# Patient Record
Sex: MALE | Race: WHITE
[De-identification: names, ages, dates, MRNs, and addresses within clinical notes are randomized per-mention and may not be internally consistent; named-entity substitution may affect disease eponyms.]

---

## 2017-06-18 ENCOUNTER — HOSPITAL ENCOUNTER (EMERGENCY)
Dept: HOSPITAL 25 - ED | Age: 47
LOS: 1 days | Discharge: HOME | End: 2017-06-19
Payer: COMMERCIAL

## 2017-06-18 DIAGNOSIS — M54.9: ICD-10-CM

## 2017-06-18 DIAGNOSIS — K21.9: ICD-10-CM

## 2017-06-18 DIAGNOSIS — K82.9: Primary | ICD-10-CM

## 2017-06-18 DIAGNOSIS — K80.80: ICD-10-CM

## 2017-06-18 DIAGNOSIS — R10.11: ICD-10-CM

## 2017-06-18 LAB
ALBUMIN SERPL BCG-MCNC: 4.7 G/DL (ref 3.2–5.2)
ALP SERPL-CCNC: 58 U/L (ref 34–104)
ALT SERPL W P-5'-P-CCNC: 77 U/L (ref 7–52)
AMYLASE SERPL-CCNC: 29 U/L (ref 29–103)
ANION GAP SERPL CALC-SCNC: 8 MMOL/L (ref 2–11)
AST SERPL-CCNC: 114 U/L (ref 13–39)
BUN SERPL-MCNC: 16 MG/DL (ref 6–24)
BUN/CREAT SERPL: 13.6 (ref 8–20)
CALCIUM SERPL-MCNC: 9.8 MG/DL (ref 8.6–10.3)
CHLORIDE SERPL-SCNC: 104 MMOL/L (ref 101–111)
GLOBULIN SER CALC-MCNC: 2.4 G/DL (ref 2–4)
GLUCOSE SERPL-MCNC: 112 MG/DL (ref 70–100)
HCO3 SERPL-SCNC: 25 MMOL/L (ref 22–32)
HCT VFR BLD AUTO: 45 % (ref 42–52)
HGB BLD-MCNC: 15.3 G/DL (ref 14–18)
LIPASE SERPL-CCNC: 42 U/L (ref 11–82)
MCH RBC QN AUTO: 28 PG (ref 27–31)
MCHC RBC AUTO-ENTMCNC: 35 G/DL (ref 31–36)
MCV RBC AUTO: 82 FL (ref 80–94)
POTASSIUM SERPL-SCNC: 3.8 MMOL/L (ref 3.5–5)
PROT SERPL-MCNC: 7.1 G/DL (ref 6.4–8.9)
RBC # BLD AUTO: 5.41 10^6/UL (ref 4–5.4)
SODIUM SERPL-SCNC: 137 MMOL/L (ref 133–145)
WBC # BLD AUTO: 10.4 10^3/UL (ref 3.5–10.8)

## 2017-06-18 PROCEDURE — 83690 ASSAY OF LIPASE: CPT

## 2017-06-18 PROCEDURE — 96374 THER/PROPH/DIAG INJ IV PUSH: CPT

## 2017-06-18 PROCEDURE — 76705 ECHO EXAM OF ABDOMEN: CPT

## 2017-06-18 PROCEDURE — 96375 TX/PRO/DX INJ NEW DRUG ADDON: CPT

## 2017-06-18 PROCEDURE — 86140 C-REACTIVE PROTEIN: CPT

## 2017-06-18 PROCEDURE — 36415 COLL VENOUS BLD VENIPUNCTURE: CPT

## 2017-06-18 PROCEDURE — 99284 EMERGENCY DEPT VISIT MOD MDM: CPT

## 2017-06-18 PROCEDURE — 83605 ASSAY OF LACTIC ACID: CPT

## 2017-06-18 PROCEDURE — 80053 COMPREHEN METABOLIC PANEL: CPT

## 2017-06-18 PROCEDURE — 85025 COMPLETE CBC W/AUTO DIFF WBC: CPT

## 2017-06-18 PROCEDURE — 82150 ASSAY OF AMYLASE: CPT

## 2017-06-19 VITALS — DIASTOLIC BLOOD PRESSURE: 88 MMHG | SYSTOLIC BLOOD PRESSURE: 142 MMHG

## 2017-06-19 NOTE — ED
Kang RICH Alok, scribed for Kellie Santizo MD on 06/18/17 at 2249 .





Abdominal Pain/Male





- HPI Summary


HPI Summary: 


46M presents to the ED with RUQ abd pain radiating to the back beginning at 

1930 this evening. Pt states he consumed a fatty meal one hour before abd pain 

began and describes the pain as a burning. Pt states he has had a similar 

episode of abd pain in the recent past more localized in the back. Pt states 

his abd pain has improved somewhat since arriving to the ED but is still 

present. Pt denies dysuria. Pt drinks ETOH occasionally.








- History of Current Complaint


Chief Complaint: EDAbdPain


Stated Complaint: ABD PAIN


Time Seen by Provider: 06/18/17 22:04


Hx Obtained From: Patient


Onset/Duration: Lasting Hours, Still Present


Timing: Constant


Severity Initially: Moderate


Severity Currently: Moderate


Pain Intensity: 6


Pain Scale Used: 0-10 Numeric


Location: Discrete At: RUQ


Radiates: Yes


Radiates to: Back


Character: Burning


Aggravating Factor(s): Food


Alleviating Factor(s): Nothing


Associated Signs And Symptoms: Positive: Back Pain.  Negative: Fever, Urinary 

Symptoms





- Allergies/Home Medications


Allergies/Adverse Reactions: 


 Allergies











Allergy/AdvReac Type Severity Reaction Status Date / Time


 


No Known Allergies Allergy   Verified 01/20/15 09:31














PMH/Surg Hx/FS Hx/Imm Hx


Endocrine/Hematology History: 


   Denies: Hx Diabetes


Cardiovascular History: 


   Denies: Hx Hypertension, Hx Pacemaker/ICD


 History: 


   Denies: Hx Renal Disease


Sensory History: 


   Denies: Hx Hearing Aid


Psychiatric History: 


   Denies: Hx Panic Disorder





- Surgical History


Surgery Procedure, Year, and Place: VASECTOMY.  LASIX EYE SURG


Infectious Disease History: No


Infectious Disease History: 


   Denies: Traveled Outside the [place] in Last 30 Days





- Family History


Known Family History: 


   Negative: Diabetes





- Social History


Occupation: Employed Full-time


Alcohol Use: None


Substance Use Type: Reports: None


Smoking Status (MU): Never Smoked Tobacco





Review of Systems


Negative: Fever


Positive: Abdominal Pain


Negative: dysuria


Positive: Other - back pain


All Other Systems Reviewed And Are Negative: Yes





Physical Exam


Triage Information Reviewed: Yes


Vital Signs On Initial Exam: 


 Initial Vitals











Temp Pulse Resp BP Pulse Ox


 


 97.6 F   84   18   203/102   98 


 


 06/18/17 20:50  06/18/17 20:50  06/18/17 20:50  06/18/17 20:50  06/18/17 20:50











Vital Signs Reviewed: Yes


Appearance: Positive: Well-Appearing, No Pain Distress


Skin: Positive: Warm, Skin Color Reflects Adequate Perfusion, Dry


Eyes: Positive: EOMI, LESIA


ENT: Positive: Pharynx normal, TMs normal


Neck: Positive: Supple, Nontender


Respiratory/Lung Sounds: Positive: Clear to Auscultation, Breath Sounds 

Present.  Negative: Rales, Rhonchi, Wheezes


Cardiovascular: Positive: RRR, Other - no gallop.  Negative: Murmur, Rub


Abdomen Description: Positive: Soft, Other: - no rebound. RUQ tenderness..  

Negative: Distended, Guarding


Bowel Sounds: Positive: Present


Musculoskeletal: Positive: Strength/ROM Intact.  Negative: Edema Left, Edema 

Right


Neurological: Positive: Sensory/Motor Intact, Alert, Oriented to Person Place, 

Time, CN Intact II-III


Psychiatric: Positive: Affect/Mood Appropriate





- Jefferson Coma Scale


Coma Scale Total: 15





Diagnostics





- Vital Signs


 Vital Signs











  Temp Pulse Resp BP Pulse Ox


 


 06/18/17 22:30   73  11  143/85  95


 


 06/18/17 22:00   73  17  135/94  94


 


 06/18/17 21:34    11  


 


 06/18/17 21:32     153/93 


 


 06/18/17 20:50  97.6 F  84  18  203/102  98














- Laboratory


Lab Results: 


 Lab Results











  06/18/17 06/18/17 06/18/17 Range/Units





  21:37 21:37 21:37 


 


WBC  10.4    (3.5-10.8)  10^3/ul


 


RBC  5.41 H    (4.0-5.4)  10^6/ul


 


Hgb  15.3    (14.0-18.0)  g/dl


 


Hct  45    (42-52)  %


 


MCV  82    (80-94)  fL


 


MCH  28    (27-31)  pg


 


MCHC  35    (31-36)  g/dl


 


RDW  14    (10.5-15)  %


 


Plt Count  200    (150-450)  10^3/ul


 


MPV  9    (7.4-10.4)  um3


 


Neut % (Auto)  75.5    (38-83)  %


 


Lymph % (Auto)  17.9 L    (25-47)  %


 


Mono % (Auto)  5.2    (1-9)  %


 


Eos % (Auto)  0.8    (0-6)  %


 


Baso % (Auto)  0.6    (0-2)  %


 


Absolute Neuts (auto)  7.9 H    (1.5-7.7)  10^3/ul


 


Absolute Lymphs (auto)  1.9    (1.0-4.8)  10^3/ul


 


Absolute Monos (auto)  0.5    (0-0.8)  10^3/ul


 


Absolute Eos (auto)  0.1    (0-0.6)  10^3/ul


 


Absolute Basos (auto)  0.1    (0-0.2)  10^3/ul


 


Absolute Nucleated RBC  0.01    10^3/ul


 


Nucleated RBC %  0.1    


 


Sodium   137   (133-145)  mmol/L


 


Potassium   3.8   (3.5-5.0)  mmol/L


 


Chloride   104   (101-111)  mmol/L


 


Carbon Dioxide   25   (22-32)  mmol/L


 


Anion Gap   8   (2-11)  mmol/L


 


BUN   16   (6-24)  mg/dL


 


Creatinine   1.18 H   (0.67-1.17)  mg/dL


 


Est GFR ( Amer)   86.2   (>60)  


 


Est GFR (Non-Af Amer)   67.1   (>60)  


 


BUN/Creatinine Ratio   13.6   (8-20)  


 


Glucose   112 H   ()  mg/dL


 


Lactic Acid    1.1  (0.5-2.0)  mmol/L


 


Calcium   9.8   (8.6-10.3)  mg/dL


 


Total Bilirubin   0.70   (0.2-1.0)  mg/dL


 


AST   114 H   (13-39)  U/L


 


ALT   77 H   (7-52)  U/L


 


Alkaline Phosphatase   58   ()  U/L


 


C-Reactive Protein   1.27   (< 5.00)  mg/L


 


Total Protein   7.1   (6.4-8.9)  g/dL


 


Albumin   4.7   (3.2-5.2)  g/dL


 


Globulin   2.4   (2-4)  g/dL


 


Albumin/Globulin Ratio   2.0   (1-3)  


 


Amylase   29   ()  U/L


 


Lipase   42   (11.0-82.0)  U/L











Result Diagrams: 


 06/18/17 21:37





 06/18/17 21:37


Lab Statement: Any lab studies that have been ordered have been reviewed, and 

results considered in the medical decision making process.





- Additional Comments


Diagnostic Additional Comments: 


gallbladder US - Findings: Positive for cholelithiasis. However no gallbladder 

wall thickening or pericholecystic fluid. Ultrasound Powell's sign is negative 

per ultrasonographer. The common bile duct could not be visualized.  Liver is 

borderline enlarged at 18 cm and is fatty infiltrated. No right hydronephrosis. 

No right upper quadrant free fluid.








Abdominal Pain Fem Course/Dx





- Course


Course Of Treatment: 45 yo male with onset of epigastric pain radiating to back 

30 mins after eating meat tacos with sour cream.  He is tender to palpation 

epigastric and right upper quadrant, u/s shows stones and liver enzymes are sl 

elevated. Pancreatic enzymes are neg and he is not a drinker.  He has no risks 

for ischemic colitis but his pressure was elevated when he arrived and was in 

pain that pain rapidly resolved on its own. He does still have pain at a 2 but 

does not want meds. He will followup with surgery, he does also identify some 

reflux and so he will be started on protonix for 14 days





- Diagnoses


Provider Diagnoses: 


 Gall bladder disease, Gallstones, GERD (gastroesophageal reflux disease)








Discharge





- Discharge Plan


Condition: Stable


Disposition: HOME


Prescriptions: 


Pantoprazole Sodium [Protonix] 20 mg PO DAILY #14 tab


Patient Education Materials:  Gallstones (ED), Gastroesophageal Reflux Disease (

ED)


Referrals: 


Danielle Bragg NP [Primary Care Provider] - 


Krishan Russell MD [Medical Doctor] - 





The documentation as recorded by the Kang inman Alok accurately reflects 

the service I personally performed and the decisions made by me, Kellie Santizo MD.

## 2017-06-19 NOTE — RAD
INDICATION:  Right upper quadrant pain.



COMPARISON:  There are no prior studies available for comparison.



TECHNIQUE: Multiple real-time images of the right upper quadrant were obtained.



FINDINGS: There are gallstones present. No pericholecystic fluid or gallbladder wall

thickening is present. No positive sonographic Powell sign is seen.  No intra or

extrahepatic ductal distention is present. The common bile duct was not well seen.



The liver is normal in size and increased in echogenicity suggestive of fatty

infiltration. No significant focal abnormality is seen. The pancreas was appear borderline

bowel gas.



The right kidney is normal in size without evidence for hydronephrosis.



IMPRESSION: 

1. CHOLELITHIASIS WITHOUT EVIDENCE FOR ACUTE CHOLECYSTITIS.

2. FINDINGS SUGGESTIVE OF FATTY INFILTRATION OF THE LIVER.

## 2019-02-24 ENCOUNTER — HOSPITAL ENCOUNTER (EMERGENCY)
Dept: HOSPITAL 25 - ED | Age: 49
Discharge: HOME | End: 2019-02-24
Payer: COMMERCIAL

## 2019-02-24 VITALS — SYSTOLIC BLOOD PRESSURE: 148 MMHG | DIASTOLIC BLOOD PRESSURE: 89 MMHG

## 2019-02-24 DIAGNOSIS — M54.9: ICD-10-CM

## 2019-02-24 DIAGNOSIS — R10.13: ICD-10-CM

## 2019-02-24 DIAGNOSIS — R10.9: ICD-10-CM

## 2019-02-24 DIAGNOSIS — R07.9: ICD-10-CM

## 2019-02-24 DIAGNOSIS — K80.50: Primary | ICD-10-CM

## 2019-02-24 LAB
ALBUMIN SERPL BCG-MCNC: 5.1 G/DL (ref 3.2–5.2)
ALBUMIN/GLOB SERPL: 2.1 {RATIO} (ref 1–3)
ALP SERPL-CCNC: 57 U/L (ref 34–104)
ALT SERPL W P-5'-P-CCNC: 16 U/L (ref 7–52)
ANION GAP SERPL CALC-SCNC: 5 MMOL/L (ref 2–11)
AST SERPL-CCNC: 17 U/L (ref 13–39)
BASOPHILS # BLD AUTO: 0.1 10^3/UL (ref 0–0.2)
BUN SERPL-MCNC: 17 MG/DL (ref 6–24)
BUN/CREAT SERPL: 14 (ref 8–20)
CALCIUM SERPL-MCNC: 9.8 MG/DL (ref 8.6–10.3)
CHLORIDE SERPL-SCNC: 106 MMOL/L (ref 101–111)
EOSINOPHIL # BLD AUTO: 0.1 10^3/UL (ref 0–0.6)
GLOBULIN SER CALC-MCNC: 2.4 G/DL (ref 2–4)
GLUCOSE SERPL-MCNC: 113 MG/DL (ref 70–100)
HCO3 SERPL-SCNC: 25 MMOL/L (ref 22–32)
HCT VFR BLD AUTO: 45 % (ref 42–52)
HGB BLD-MCNC: 15.5 G/DL (ref 14–18)
INR PPP/BLD: 0.93 (ref 0.77–1.02)
LYMPHOCYTES # BLD AUTO: 2 10^3/UL (ref 1–4.8)
MCH RBC QN AUTO: 30 PG (ref 27–31)
MCHC RBC AUTO-ENTMCNC: 34 G/DL (ref 31–36)
MCV RBC AUTO: 87 FL (ref 80–94)
MONOCYTES # BLD AUTO: 0.6 10^3/UL (ref 0–0.8)
NEUTROPHILS # BLD AUTO: 6.3 10^3/UL (ref 1.5–7.7)
NRBC # BLD AUTO: 0 10^3/UL
NRBC BLD QL AUTO: 0
PLATELET # BLD AUTO: 266 10^3/UL (ref 150–450)
POTASSIUM SERPL-SCNC: 4.1 MMOL/L (ref 3.5–5)
PROT SERPL-MCNC: 7.5 G/DL (ref 6.4–8.9)
RBC # BLD AUTO: 5.19 10^6/UL (ref 4–5.4)
SODIUM SERPL-SCNC: 136 MMOL/L (ref 135–145)
TROPONIN I SERPL-MCNC: 0 NG/ML (ref ?–0.04)
WBC # BLD AUTO: 9 10^3/UL (ref 3.5–10.8)

## 2019-02-24 PROCEDURE — 83690 ASSAY OF LIPASE: CPT

## 2019-02-24 PROCEDURE — 93005 ELECTROCARDIOGRAM TRACING: CPT

## 2019-02-24 PROCEDURE — 36415 COLL VENOUS BLD VENIPUNCTURE: CPT

## 2019-02-24 PROCEDURE — 80053 COMPREHEN METABOLIC PANEL: CPT

## 2019-02-24 PROCEDURE — 85025 COMPLETE CBC W/AUTO DIFF WBC: CPT

## 2019-02-24 PROCEDURE — 85610 PROTHROMBIN TIME: CPT

## 2019-02-24 PROCEDURE — 86140 C-REACTIVE PROTEIN: CPT

## 2019-02-24 PROCEDURE — 99283 EMERGENCY DEPT VISIT LOW MDM: CPT

## 2019-02-24 PROCEDURE — 84484 ASSAY OF TROPONIN QUANT: CPT

## 2019-02-24 PROCEDURE — 83605 ASSAY OF LACTIC ACID: CPT

## 2019-02-24 PROCEDURE — 96372 THER/PROPH/DIAG INJ SC/IM: CPT

## 2019-02-24 NOTE — ED
Complex/Multi-Sys Presentation





- HPI Summary


HPI Summary: 


 Patient is a 49 y/o M presenting to ED with complaints of back pain, midsternal

/epigastric pain onsetting last night. In the room, he states that he thinks he 

is having another "gallbladder attack". PMHx of gallstones, patient is followed 

by Dr. Jennings. He states that there was a desire to remove his gallbladder but 

patient decided against this. Patient notes that he is to see Dr. Jennings 

tomorrow on 2/25. At present, pain is mostly in upper back but it has been 

"moving" to midsternal chest/epigastric area. Onset of pain at 2100/2130 last 

night, he denies pain in shoulder, states that pain onset a few hours after he 

ate pasta and chicken dinner. Patient reports similar episodes of pain related 

to his gallbladder issues, but states that he was concerned that the pain was 

migrating to his chest/epigastric area. Patient does not smoke cigarettes. No N/

V but states he was experiencing reflux. Patient took 3 ibuprofen 3 hours ago 

after onset of pain with no relief or worsening of Sx. In room, he states pain 

is improving. FMHx of kidney stones. No Hx of pancreatic issues, states he is 

an occasional drinker of alcohol. On triage, pain is rated 7/10, nothing is 

noted to aggravate/alleviate Sx. Home medications and allergies are reviewed. 








- History Of Current Complaint


Chief Complaint: EDAbdPain


Time Seen by Provider: 02/24/19 02:59


Hx Obtained From: Patient


Onset/Duration: Lasting Hours - onset 2100/2130 last night, Still Present


Timing: Constant, Hours - onset 2100/2130 last night


Severity Currently: Severe


Severity Initially: Moderate


Location: Pain At: - upper back, chest/epigastric area


Aggravating Factor(s): nothing


Alleviating Factor(s): nothing


Associated Signs And Symptoms: Positive: Chest Pain, Abdominal Pain - epigastric

, Back Pain.  Negative: Nausea, Vomiting





- Allergies/Home Medications


Allergies/Adverse Reactions: 


 Allergies











Allergy/AdvReac Type Severity Reaction Status Date / Time


 


No Known Allergies Allergy   Verified 02/24/19 02:47














PMH/Surg Hx/FS Hx/Imm Hx


Endocrine/Hematology History: 


   Denies: Hx Diabetes


Cardiovascular History: 


   Denies: Hx Hypertension, Hx Pacemaker/ICD


 History: 


   Denies: Hx Renal Disease


Sensory History: 


   Denies: Hx Hearing Aid


Psychiatric History: 


   Denies: Hx Panic Disorder





- Surgical History


Surgery Procedure, Year, and Place: VASECTOMY.  LASIX EYE SURG


Infectious Disease History: No


Infectious Disease History: 


   Denies: Traveled Outside the [place] in Last 30 Days





- Family History


Known Family History: Positive: Renal Disease - kidney stones 


   Negative: Diabetes





- Social History


Alcohol Use: None


Substance Use Type: Reports: None


Smoking Status (MU): Never Smoked Tobacco





Review of Systems


Positive: Chest Pain


Positive: Abdominal Pain - epigastric .  Negative: Vomiting, Nausea


Musculoskeletal: Other - POSITIVE - BACK PAIN; NEGATIVE - SHOULDER PAIN 


All Other Systems Reviewed And Are Negative: Yes





Physical Exam





- Summary


Physical Exam Summary: 


Appearance: Well appearing, no pain distress


Skin: warm, dry, reflects adequate perfusion


Head/face: normal


Eyes: EOMI, LESIA


ENT: mucous membranes moist


Neck: supple, non-tender


Respiratory: CTA, breath sounds present


Cardiovascular: RRR, pulses symmetrical 


Abdomen: non-tender, soft, no palpable masses


Bowel Sounds: present


Musculoskeletal: normal, strength/ROM intact


Neuro: normal, sensory motor intact, A&Ox3








Triage Information Reviewed: Yes


Vital Signs On Initial Exam: 


 Initial Vitals











Temp Pulse Resp BP Pulse Ox


 


 97.9 F   78   18   172/112   96 


 


 02/24/19 02:44  02/24/19 02:44  02/24/19 02:44  02/24/19 02:44  02/24/19 02:44











Vital Signs Reviewed: Yes





Diagnostics





- Vital Signs


 Vital Signs











  Temp Pulse Resp BP Pulse Ox


 


 02/24/19 02:44  97.9 F  78  18  172/112  96














- Laboratory


Result Diagrams: 


 02/24/19 03:05





 02/24/19 03:05


Lab Statement: Any lab studies that have been ordered have been reviewed, and 

results considered in the medical decision making process.





- Radiology


  ** bedside ultrasound


Radiology Interpretation Completed By: ED Physician - Performed by me.  

Gallbladder is nondistended with visible stones.  The wall is normal and there 

is no pericholecystic fluid negative for sonographic Powell.  The abdominal 

aorta measures 2.55 cm





- EKG


  ** 0330


Cardiac Rate: NL - rate of 69 BPM


EKG Rhythm: Sinus Rhythm


ST Segment: Normal


Summary of EKG Findings: EKG showed NSR with rate of 69 BPM, normal axis, 

normal interval, normal ST.





Re-Evaluation





- Re-Evaluation


  ** First Eval


Re-Evaluation Time: 03:15


Comment: Bedside US performed by Dr. Medina. Gallstones noted, no sonogprahic 

powell's, could not visualize common bile ducts, no thickening of the wall.





  ** Second Eval


Re-Evaluation Time: 04:58


Change: Improved


Comment: Patient reports that he is feeling better after medications. Patient 

will be discharged to home and follow up with Dr. Jennings as planned tomorrow.





Complex Multi-Symp Course/Dx


Course Of Treatment: Nurse's notes reviewed.  Patient with a history of biliary 

colic and known gallstones presents with mid back pain that is nonradiating and 

a lack of urinary symptoms.  He has normal lipase and LFTs.  Bedside ultrasound 

was performed given that there is no available formal ultrasound at this hour.  

There is no evidence for acute cholecystitis.  The common bile duct was not 

visualized.  He was treated here for discomfort with significant relief.  He is 

able to be discharged home and has follow-up with his surgeon on Monday morning.





- Diagnoses


Differential Diagnoses/HQI/PQRI: Other - Pancreatitis, cholecystitis, acute 

gastritis, abdominal aortic aneurysm


Provider Diagnoses: 


 Biliary colic, Abdominal pain








Discharge





- Sign-Out/Discharge


Documenting (check all that apply): Patient Departure - discharge


Patient Received Moderate/Deep Sedation with Procedure: No - NO PROCEDURES DONE





- Discharge Plan


Condition: Improved


Disposition: HOME


Prescriptions: 


Famotidine TAB* [Pepcid 20 MG TAB*] 20 mg PO BID #20 tab


Naproxen [Naproxen 500 mg tab] 500 mg PO BID PRN #10 tablet


 PRN Reason: Pain


traMADol TAB* [Ultram*] 50 mg PO Q8H PRN #10 tab MDD 3


 PRN Reason: more severe pain


Patient Education Materials:  Biliary Colic (ED)


Referrals: 


Rabia Suazo MD [Primary Care Provider] - 


Kaden Jennings MD [Medical Doctor] - 


Additional Instructions: 


Follow-up with the surgeon on Monday as scheduled.  You may need to have 

further testing done.  Strictly avoid fat in your diet.  Return with fever, 

vomiting, uncontrolled pain, worse or other concerns.





- Billing Disposition and Condition


Condition: IMPROVED


Disposition: Home





- Attestation Statements


Document Initiated by Scribe: Yes


Documenting Scribe: MARCELA STALEY 


Provider For Whom Scribe is Documenting (Include Credential): RITA MEDINA MD


Scribe Attestation: 


I, MARCELA STALEY , scribed for RITA MEDINA MD on 02/24/19 at 0710. 


Scribe Documentation Reviewed: Yes


Provider Attestation: 


The documentation as recorded by the scribe, MARCELA STALEY  accurately reflects 

the service I personally performed and the decisions made by me, RITA MEDINA MD


Status of Scribe Document: Viewed

## 2019-03-12 ENCOUNTER — HOSPITAL ENCOUNTER (OUTPATIENT)
Dept: HOSPITAL 25 - OR | Age: 49
Discharge: HOME | End: 2019-03-12
Attending: SURGERY
Payer: COMMERCIAL

## 2019-03-12 VITALS — SYSTOLIC BLOOD PRESSURE: 160 MMHG | DIASTOLIC BLOOD PRESSURE: 98 MMHG

## 2019-03-12 DIAGNOSIS — K80.10: Primary | ICD-10-CM

## 2019-03-12 PROCEDURE — 88304 TISSUE EXAM BY PATHOLOGIST: CPT

## 2019-03-12 RX ADMIN — HYDROMORPHONE HYDROCHLORIDE PRN MG: 1 INJECTION, SOLUTION INTRAMUSCULAR; INTRAVENOUS; SUBCUTANEOUS at 09:57

## 2019-03-12 RX ADMIN — HYDROMORPHONE HYDROCHLORIDE PRN MG: 1 INJECTION, SOLUTION INTRAMUSCULAR; INTRAVENOUS; SUBCUTANEOUS at 10:11

## 2019-03-12 NOTE — OP
CC:  Rabia Suazo MD *

 

DATE OF OPERATION:  03/12/19 - [facility]

 

DATE OF BIRTH:  [birth year]

 

SURGEON:  Kaden Jennings MD.

 

ASSISTANT:  MAREK Desir student.

 

ANESTHESIOLOGIST:  Cait Purdy MD

 

ANESTHESIA:  General endotracheal.

 

PRE-OP DIAGNOSIS:  Symptomatic cholelithiasis.

 

POST-OP DIAGNOSES:  Symptomatic cholelithiasis and chronic cholecystitis.

 

OPERATIVE PROCEDURE:  Laparoscopic cholecystectomy.

 

ESTIMATED BLOOD LOSS:  Less than 50 mL.

 

IV FLUIDS:  Crystalloid.

 

SPECIMENS:  Gallbladder.

 

DRAINS:  None.

 

COMPLICATIONS:  None.

 

COUNTS:  The instruments, needle and sponge counts were correct.

 

DESCRIPTION OF PROCEDURE:  The patient was brought to the operating room and 
placed on the table supine.  Sequential compression devices were placed on both 
lower extremities.  General anesthesia was administered.  The abdomen was 
prepped and draped in the usual sterile fashion.  A time-out was performed.

 

Local anesthetic was infiltrated into the skin and soft tissue prior to making 
each incision.  Entry to the abdomen was through a transumbilical vertical 
incision using an open technique.  After accessing the peritoneal cavity, a 5 
mm trocar was placed, carbon dioxide was insufflated to a pressure of 15 mmHg.  
Under direct visualization, 5 mm trocars were placed in the subxiphoid position 
and 2 in the right upper quadrant.  The initial umbilical trocars were 
exchanged for a 

12 mm trocar.

 

Inspection in the right upper quadrant revealed omentum adherent to the fundus 
of the gallbladder and this was taken down using a combination of sharp and 
blunt dissection.  The gallbladder fundus was grasped and retracted superiorly.
  There appeared to be chronic inflammatory changes.  There were adhesions to 
the duodenum that were taken down sharply.  The peritoneum investing the 
gallbladder was incised on the medial and lateral aspects in order to dissect 
down to the cystic duct and cystic artery and obtain a critical view.  Both 
structures were doubly clipped and divided.  The gallbladder was grasped at the 
cystic duct stump and retracted cephalad as the gallbladder was dissected free 
from the liver staying in an avascular plane.  Once the gallbladder was freed, 
it was placed in an endoscopic retrieval bag and retrieved through the 
umbilical site.

 

After reestablishing pneumoperitoneum, inspection revealed there was some 
oozing along the edge of the gallbladder fossa in the anterior aspect, which 
was cauterized to achieve hemostasis.  Copious lavage was performed until 
clear.  Clips were noted to be intact.  Hemostasis was assured.  The ports were 
removed under direct visualization and carbon dioxide was released.  The 
umbilical wound was closed with 0-Vicryl in an interrupted figure-of-eight 
fashion to approximate the fascia.  Skin incisions were closed with 4-0 
Monocryl in a subcuticular fashion.  Dressings were applied.  The patient 
tolerated this procedure well, was extubated and transferred to recovery room 
in stable condition.

 

 [id number]/CPS #: [id number]

PEREZ